# Patient Record
Sex: FEMALE | Race: WHITE | URBAN - METROPOLITAN AREA
[De-identification: names, ages, dates, MRNs, and addresses within clinical notes are randomized per-mention and may not be internally consistent; named-entity substitution may affect disease eponyms.]

---

## 2019-09-16 ENCOUNTER — OFFICE VISIT (OUTPATIENT)
Dept: URBAN - METROPOLITAN AREA CLINIC 76 | Facility: CLINIC | Age: 84
End: 2019-09-16
Payer: COMMERCIAL

## 2019-09-16 DIAGNOSIS — H35.3191 NONEXUDATIVE AGE-RELATED MACULAR DEGENERATION, EARLY DRY STAGE: ICD-10-CM

## 2019-09-16 PROCEDURE — 92004 COMPRE OPH EXAM NEW PT 1/>: CPT | Performed by: OPTOMETRIST

## 2019-09-16 ASSESSMENT — INTRAOCULAR PRESSURE
OD: 14
OS: 14

## 2019-09-16 ASSESSMENT — KERATOMETRY: OD: 45.13

## 2019-09-16 ASSESSMENT — VISUAL ACUITY: OD: 20/60

## 2019-09-16 NOTE — IMPRESSION/PLAN
Impression: Nonexudative age-related macular degeneration, early dry stage: H35.3191. OD.  Plan: See above plan

## 2019-09-16 NOTE — IMPRESSION/PLAN
Impression: Exudative age-related macular degeneration of left eye: H35.3220. Plan: Discussed diagnosis in detail with patient. Counseling given about the benefits and/or risks of the Age-Related Eye Disease Study (AREDS) formulation for preventing progression of age-related macular degeneration (AMD). Add lutein 20-30 mg, zeaxanthin 2-5mg per day with MV or AREDS 2 MV PO daily as directed. Will continue to observe condition and or symptoms. Call if [address] worsens. Dispensed and explained use of [employer] grid.

## 2019-09-16 NOTE — IMPRESSION/PLAN
Impression: Presbyopia: H52.4. OU. Plan: Discussed condition. Due to macular degeneration no a significant improvement in vision, do not recommend new glasses at this time.

## 2019-10-17 ENCOUNTER — OFFICE VISIT (OUTPATIENT)
Dept: URBAN - METROPOLITAN AREA CLINIC 76 | Facility: CLINIC | Age: 84
End: 2019-10-17
Payer: COMMERCIAL

## 2019-10-17 DIAGNOSIS — H52.4 PRESBYOPIA: ICD-10-CM

## 2019-10-17 DIAGNOSIS — H04.123 DRY EYE SYNDROME OF BILATERAL LACRIMAL GLANDS: ICD-10-CM

## 2019-10-17 DIAGNOSIS — Z96.1 PRESENCE OF INTRAOCULAR LENS: ICD-10-CM

## 2019-10-17 DIAGNOSIS — H35.3220 EXUDATIVE AGE-RELATED MCLR DEGN, LEFT EYE, STAGE UNSPECIFIED: Primary | ICD-10-CM

## 2019-10-17 PROCEDURE — 99214 OFFICE O/P EST MOD 30 MIN: CPT | Performed by: OPTOMETRIST

## 2019-10-17 ASSESSMENT — INTRAOCULAR PRESSURE
OD: 15
OS: 12

## 2019-10-17 NOTE — IMPRESSION/PLAN
Impression: Exudative age-related macular degeneration of left eye: H35.3220. Testing done today. 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 Plan: Discussed diagnosis in detail with patient. OCT done today OD Mild hard drusen without atrophy OS Soft drusen with mild atrophy.  Will continue to monitor

## 2019-10-17 NOTE — IMPRESSION/PLAN
Impression: Presbyopia: H52.4. OU. Plan: Rediscussed condition. Due to macular degeneration no a significant improvement in vision, do not recommend new glasses at this time.